# Patient Record
Sex: FEMALE | Race: BLACK OR AFRICAN AMERICAN | NOT HISPANIC OR LATINO | Employment: FULL TIME | URBAN - METROPOLITAN AREA
[De-identification: names, ages, dates, MRNs, and addresses within clinical notes are randomized per-mention and may not be internally consistent; named-entity substitution may affect disease eponyms.]

---

## 2020-01-20 ENCOUNTER — HISTORICAL (OUTPATIENT)
Dept: ADMINISTRATIVE | Facility: HOSPITAL | Age: 40
End: 2020-01-20

## 2020-01-23 LAB — FINAL CULTURE: NORMAL

## 2022-04-11 ENCOUNTER — HISTORICAL (OUTPATIENT)
Dept: ADMINISTRATIVE | Facility: HOSPITAL | Age: 42
End: 2022-04-11
Payer: MEDICAID

## 2022-04-29 VITALS
DIASTOLIC BLOOD PRESSURE: 80 MMHG | OXYGEN SATURATION: 100 % | WEIGHT: 293 LBS | BODY MASS INDEX: 47.09 KG/M2 | SYSTOLIC BLOOD PRESSURE: 129 MMHG | HEIGHT: 66 IN

## 2022-05-27 RX ORDER — BUPROPION HYDROCHLORIDE 150 MG/1
150 TABLET, EXTENDED RELEASE ORAL 2 TIMES DAILY
COMMUNITY

## 2022-05-27 RX ORDER — METFORMIN HYDROCHLORIDE 1000 MG/1
1000 TABLET ORAL 2 TIMES DAILY WITH MEALS
COMMUNITY

## 2022-05-27 RX ORDER — PIOGLITAZONEHYDROCHLORIDE 30 MG/1
30 TABLET ORAL DAILY
COMMUNITY

## 2022-05-27 RX ORDER — TRAZODONE HYDROCHLORIDE 50 MG/1
100 TABLET ORAL NIGHTLY PRN
COMMUNITY

## 2022-05-27 RX ORDER — ONDANSETRON HYDROCHLORIDE 8 MG/1
8 TABLET, FILM COATED ORAL EVERY 12 HOURS PRN
COMMUNITY

## 2022-05-27 RX ORDER — BUSPIRONE HYDROCHLORIDE 5 MG/1
5 TABLET ORAL 2 TIMES DAILY
COMMUNITY

## 2022-05-27 RX ORDER — FLUCONAZOLE 150 MG/1
150 TABLET ORAL DAILY
COMMUNITY

## 2022-06-07 DIAGNOSIS — Z12.4 ROUTINE CERVICAL SMEAR: Primary | ICD-10-CM

## 2022-10-25 DIAGNOSIS — R60.0 LOCALIZED EDEMA: ICD-10-CM

## 2022-10-25 DIAGNOSIS — I10 ESSENTIAL HYPERTENSION, MALIGNANT: Primary | ICD-10-CM

## 2022-10-25 DIAGNOSIS — R06.09 DYSPNEA ON EXERTION: Primary | ICD-10-CM

## 2022-11-08 ENCOUNTER — HOSPITAL ENCOUNTER (OUTPATIENT)
Dept: CARDIOLOGY | Facility: HOSPITAL | Age: 42
Discharge: HOME | End: 2022-11-08
Attending: INTERNAL MEDICINE
Payer: COMMERCIAL

## 2022-11-08 ENCOUNTER — HOSPITAL ENCOUNTER (OUTPATIENT)
Dept: RADIOLOGY | Facility: HOSPITAL | Age: 42
Discharge: HOME | End: 2022-11-08
Attending: INTERNAL MEDICINE
Payer: COMMERCIAL

## 2022-11-08 VITALS — HEIGHT: 66 IN | WEIGHT: 293 LBS | BODY MASS INDEX: 47.09 KG/M2

## 2022-11-08 DIAGNOSIS — R60.0 LOCALIZED EDEMA: ICD-10-CM

## 2022-11-08 DIAGNOSIS — I10 ESSENTIAL HYPERTENSION, MALIGNANT: ICD-10-CM

## 2022-11-08 DIAGNOSIS — R06.09 DYSPNEA ON EXERTION: ICD-10-CM

## 2022-11-08 LAB
AV INDEX (PROSTH): 0.53
AV MEAN GRADIENT: 6 MMHG
AV PEAK GRADIENT: 12 MMHG
AV VALVE AREA: 1.49 CM2
AV VELOCITY RATIO: 0.52
BSA FOR ECHO PROCEDURE: 3.39 M2
DOP CALC AO PEAK VEL: 1.74 M/S
DOP CALC AO VTI: 30 CM
DOP CALC LVOT AREA: 2.8 CM2
DOP CALC LVOT DIAMETER: 1.9 CM
DOP CALC LVOT PEAK VEL: 0.91 M/S
DOP CALC LVOT STROKE VOLUME: 44.77 CM3
DOP CALC MV VTI: 30.7 CM
DOP CALCLVOT PEAK VEL VTI: 15.8 CM
E WAVE DECELERATION TIME: 132 MSEC
E/A RATIO: 2.14
E/E' RATIO: 11.67 M/S
EJECTION FRACTION: 60 %
FRACTIONAL SHORTENING: 30 % (ref 28–44)
LEFT ATRIUM SIZE: 4.4 CM
LEFT ATRIUM VOLUME INDEX MOD: 21.9 ML/M2
LEFT ATRIUM VOLUME MOD: 67 CM3
LEFT INTERNAL DIMENSION IN SYSTOLE: 2.83 CM (ref 2.1–4)
LEFT VENTRICLE DIASTOLIC VOLUME INDEX: 23.56 ML/M2
LEFT VENTRICLE DIASTOLIC VOLUME: 72.1 ML
LEFT VENTRICLE SYSTOLIC VOLUME INDEX: 9.9 ML/M2
LEFT VENTRICLE SYSTOLIC VOLUME: 30.3 ML
LEFT VENTRICULAR INTERNAL DIMENSION IN DIASTOLE: 4.05 CM (ref 3.5–6)
LV LATERAL E/E' RATIO: 17.5 M/S
LV SEPTAL E/E' RATIO: 8.75 M/S
LVOT MG: 2 MMHG
LVOT MV: 0.57 CM/S
MV MEAN GRADIENT: 3 MMHG
MV PEAK A VEL: 0.49 M/S
MV PEAK E VEL: 1.05 M/S
MV PEAK GRADIENT: 8 MMHG
MV STENOSIS PRESSURE HALF TIME: 72 MS
MV VALVE AREA BY CONTINUITY EQUATION: 1.46 CM2
MV VALVE AREA P 1/2 METHOD: 3.06 CM2
PV PEAK VELOCITY: 0.96 CM/S
RA PRESSURE: 3 MMHG
RIGHT VENTRICULAR END-DIASTOLIC DIMENSION: 2.98 CM
TDI LATERAL: 0.06 M/S
TDI SEPTAL: 0.12 M/S
TDI: 0.09 M/S
TRICUSPID ANNULAR PLANE SYSTOLIC EXCURSION: 3.21 CM

## 2022-11-08 PROCEDURE — 93306 TTE W/DOPPLER COMPLETE: CPT

## 2022-11-08 PROCEDURE — 93306 TTE W/DOPPLER COMPLETE: CPT | Mod: 26,,, | Performed by: INTERNAL MEDICINE

## 2022-11-08 PROCEDURE — 93970 CV US LOWER VENOUS INSUFFICIENCY BILATERAL (CUPID ONLY): ICD-10-PCS | Mod: 26,,, | Performed by: SPECIALIST

## 2022-11-08 PROCEDURE — 93306 ECHO (CUPID ONLY): ICD-10-PCS | Mod: 26,,, | Performed by: INTERNAL MEDICINE

## 2022-11-08 PROCEDURE — 93970 EXTREMITY STUDY: CPT | Mod: TC

## 2022-11-08 PROCEDURE — 93970 EXTREMITY STUDY: CPT | Mod: 26,,, | Performed by: SPECIALIST

## 2022-12-06 ENCOUNTER — HOSPITAL ENCOUNTER (EMERGENCY)
Facility: HOSPITAL | Age: 42
Discharge: HOME | End: 2022-12-06
Attending: EMERGENCY MEDICINE
Payer: COMMERCIAL

## 2022-12-06 VITALS
HEIGHT: 66 IN | SYSTOLIC BLOOD PRESSURE: 137 MMHG | RESPIRATION RATE: 20 BRPM | OXYGEN SATURATION: 98 % | HEART RATE: 84 BPM | BODY MASS INDEX: 47.09 KG/M2 | TEMPERATURE: 99 F | WEIGHT: 293 LBS | DIASTOLIC BLOOD PRESSURE: 74 MMHG

## 2022-12-06 DIAGNOSIS — K59.00 CONSTIPATION, UNSPECIFIED CONSTIPATION TYPE: Primary | ICD-10-CM

## 2022-12-06 PROCEDURE — 25000003 PHARM REV CODE 250: Performed by: EMERGENCY MEDICINE

## 2022-12-06 PROCEDURE — 99283 EMERGENCY DEPT VISIT LOW MDM: CPT

## 2022-12-06 RX ORDER — SYRING-NEEDL,DISP,INSUL,0.3 ML 29 G X1/2"
296 SYRINGE, EMPTY DISPOSABLE MISCELLANEOUS
Status: DISCONTINUED | OUTPATIENT
Start: 2022-12-06 | End: 2022-12-06

## 2022-12-06 RX ORDER — ADHESIVE BANDAGE
10 BANDAGE TOPICAL
Status: COMPLETED | OUTPATIENT
Start: 2022-12-06 | End: 2022-12-06

## 2022-12-06 RX ORDER — POLYETHYLENE GLYCOL 3350 17 G/17G
17 POWDER, FOR SOLUTION ORAL 2 TIMES DAILY
Qty: 595 G | Refills: 0 | Status: SHIPPED | OUTPATIENT
Start: 2022-12-06

## 2022-12-06 RX ORDER — BISACODYL 10 MG
10 SUPPOSITORY, RECTAL RECTAL ONCE
Status: COMPLETED | OUTPATIENT
Start: 2022-12-06 | End: 2022-12-06

## 2022-12-06 RX ADMIN — BISACODYL 10 MG: 10 SUPPOSITORY RECTAL at 07:12

## 2022-12-06 RX ADMIN — MAGNESIUM HYDROXIDE 800 MG: 400 SUSPENSION ORAL at 08:12

## 2022-12-06 NOTE — DISCHARGE INSTRUCTIONS
Take milk of magnesia and MiraLax twice daily until the constipation resolves.  Then you can take MiraLax once daily to maintain a soft regular bowel movement.  Follow-up with your doctor for further care.

## 2022-12-06 NOTE — Clinical Note
"Taqueria Quirozbarrington Live was seen and treated in our emergency department on 12/6/2022.  She may return to work on 12/09/2022.       If you have any questions or concerns, please don't hesitate to call.      Edith DUMONT    "

## 2022-12-07 NOTE — ED PROVIDER NOTES
Encounter Date: 12/6/2022       History     Chief Complaint   Patient presents with    Rectal Pain     42-year-old female with a history of diabetes, hypertension, lumbar radiculopathy, anxiety, cervical radiculopathy, morbid obesity, complains of constipation for the last week.  She is passed a small amount of hard stool.  She is taken stool softeners and has tried to do a Fleet's enema without relief.  She states she feels like there is hard stool in her rectum.      Review of patient's allergies indicates:  No Known Allergies  Past Medical History:   Diagnosis Date    Anxiety disorder, unspecified     Cervical radicular pain     Diabetes mellitus type 2 with complications     HTN (hypertension)     Lumbar radicular pain     Obesity      No past surgical history on file.  No family history on file.  Social History     Tobacco Use    Smoking status: Never    Smokeless tobacco: Never   Substance Use Topics    Alcohol use: Not Currently    Drug use: Never     Review of Systems   Gastrointestinal:  Positive for constipation.   All other systems reviewed and are negative.    Physical Exam     Initial Vitals [12/06/22 0727]   BP Pulse Resp Temp SpO2   137/74 84 20 98.8 °F (37.1 °C) 98 %      MAP       --         Physical Exam    Nursing note and vitals reviewed.  Constitutional: She appears well-developed and well-nourished. She is not diaphoretic. No distress.   HENT:   Head: Normocephalic and atraumatic.   Mouth/Throat: Oropharynx is clear and moist.   Eyes: Conjunctivae are normal. Pupils are equal, round, and reactive to light.   Neck: Neck supple.   Cardiovascular:  Normal rate, regular rhythm, normal heart sounds and intact distal pulses.           Pulmonary/Chest: Breath sounds normal. No respiratory distress. She has no wheezes. She has no rhonchi. She has no rales.   Abdominal: Abdomen is soft. Bowel sounds are normal. She exhibits no distension. There is no abdominal tenderness.   On rectal exam there is no  palpable stool, no blood, no hemorrhoids, no abnormality. There is no guarding.   Musculoskeletal:         General: No tenderness or edema. Normal range of motion.      Cervical back: Neck supple.     Neurological: She is alert and oriented to person, place, and time.   Skin: Skin is warm and dry. Capillary refill takes less than 2 seconds. No rash noted.   Psychiatric: She has a normal mood and affect. Thought content normal.       ED Course   Procedures  Labs Reviewed - No data to display       Imaging Results    None          Medications   bisacodyL suppository 10 mg (10 mg Rectal Given 12/6/22 0750)   magnesium hydroxide 400 mg/5 ml suspension 800 mg (800 mg Oral Given 12/6/22 0824)     Medical Decision Making:   Initial Assessment:   42-year-old female with multiple comorbidities complains of constipation for the last week, passing small amounts of very hard stool.  Differential Diagnosis:   Differential diagnosis includes but is not limited to constipation, fecal impaction  ED Management:  Digital rectal exam was performed and there was no stool in the rectal vault.  I recommended that she drink the magnesium hydroxide and I placed a Dulcolax suppository at the time of the rectal exam.  At home, I recommend she take MiraLax and mild of magnesia orally twice daily until the constipation is relieved.  She has no abdominal pain so I do not feel she has a bowel obstruction or other serious pathology.                        Clinical Impression:   Final diagnoses:  [K59.00] Constipation, unspecified constipation type (Primary)        ED Disposition Condition    Discharge Stable          ED Prescriptions       Medication Sig Dispense Start Date End Date Auth. Provider    polyethylene glycol (GLYCOLAX) 17 gram/dose powder Take 17 g by mouth 2 (two) times daily. 595 g 12/6/2022 -- Leeanne Murcia MD          Follow-up Information       Follow up With Specialties Details Why Contact Info    PCP  Schedule an  appointment as soon as possible for a visit                Leeanne Murcia MD  12/07/22 2018       Leeanne Murcia MD  12/07/22 1244

## 2023-07-11 ENCOUNTER — HOSPITAL ENCOUNTER (EMERGENCY)
Facility: HOSPITAL | Age: 43
Discharge: HOME | End: 2023-07-11
Attending: STUDENT IN AN ORGANIZED HEALTH CARE EDUCATION/TRAINING PROGRAM
Payer: COMMERCIAL

## 2023-07-11 VITALS
BODY MASS INDEX: 47.09 KG/M2 | TEMPERATURE: 99 F | RESPIRATION RATE: 18 BRPM | WEIGHT: 293 LBS | HEART RATE: 98 BPM | SYSTOLIC BLOOD PRESSURE: 166 MMHG | HEIGHT: 66 IN | OXYGEN SATURATION: 98 % | DIASTOLIC BLOOD PRESSURE: 75 MMHG

## 2023-07-11 DIAGNOSIS — M79.605 PAIN OF LEFT LOWER EXTREMITY: ICD-10-CM

## 2023-07-11 DIAGNOSIS — M54.9 ACUTE LEFT-SIDED BACK PAIN, UNSPECIFIED BACK LOCATION: Primary | ICD-10-CM
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
## 2023-07-11 PROCEDURE — 25000003 PHARM REV CODE 250

## 2023-07-11 PROCEDURE — 99284 EMERGENCY DEPT VISIT MOD MDM: CPT

## 2023-07-11 RX ORDER — HYDROCODONE BITARTRATE AND ACETAMINOPHEN 7.5; 325 MG/1; MG/1
1 TABLET ORAL EVERY 6 HOURS PRN
Qty: 12 TABLET | Refills: 0 | Status: SHIPPED | OUTPATIENT
Start: 2023-07-11

## 2023-07-11 RX ORDER — ONDANSETRON 4 MG/1
4 TABLET, FILM COATED ORAL EVERY 6 HOURS PRN
Qty: 12 TABLET | Refills: 0 | Status: SHIPPED | OUTPATIENT
Start: 2023-07-11

## 2023-07-11 RX ORDER — ONDANSETRON 4 MG/1
4 TABLET, ORALLY DISINTEGRATING ORAL
Status: COMPLETED | OUTPATIENT
Start: 2023-07-11 | End: 2023-07-11

## 2023-07-11 RX ORDER — HYDROCODONE BITARTRATE AND ACETAMINOPHEN 7.5; 325 MG/1; MG/1
1 TABLET ORAL
Status: COMPLETED | OUTPATIENT
Start: 2023-07-11 | End: 2023-07-11

## 2023-07-11 RX ORDER — HYDROCODONE BITARTRATE AND ACETAMINOPHEN 7.5; 325 MG/1; MG/1
1 TABLET ORAL EVERY 6 HOURS PRN
Qty: 12 TABLET | Refills: 0 | Status: SHIPPED | OUTPATIENT
Start: 2023-07-11 | End: 2023-07-11 | Stop reason: SDUPTHER

## 2023-07-11 RX ADMIN — ONDANSETRON 4 MG: 4 TABLET, ORALLY DISINTEGRATING ORAL at 06:07

## 2023-07-11 RX ADMIN — HYDROCODONE BITARTRATE AND ACETAMINOPHEN 1 TABLET: 7.5; 325 TABLET ORAL at 06:07

## 2023-07-11 NOTE — FIRST PROVIDER EVALUATION
"Medical screening examination initiated.  I have conducted a focused provider triage encounter, findings are as follows:    Chief Complaint   Patient presents with    Back Pain     C/o L-sided neck pain, L-sided back pain, and bilateral thigh pain onset yesterday after fall in bathroom. Pt denies LOC or hitting head. Pt able to ambulate with steady gait and cane.        Brief history of present illness:  42 y.o. female presents to the E.D. with c/o left sided neck pain, low back pain and bilateral thigh pain after fall yesterday in bathroom. Did not hit head. Patient denies feeling dizzy. She is able to ambulate.    Vitals:    07/11/23 1658   BP: (!) 166/75   BP Location: Left arm   Patient Position: Sitting   Pulse: 98   Resp: 17   Temp: 98.8 °F (37.1 °C)   TempSrc: Oral   SpO2: 98%   Weight: (!) 283 kg (624 lb)   Height: 5' 6" (1.676 m)       Pertinent physical exam:  Awake, Alert, Oriented, Non labored breathing       Brief workup plan:  imaging     Preliminary workup initiated; this workup will be continued and followed by the physician or advanced practice provider that is assigned to the patient when roomed.  "

## 2023-07-11 NOTE — ED PROVIDER NOTES
Encounter Date: 7/11/2023       History     Chief Complaint   Patient presents with    Back Pain     C/o L-sided neck pain, L-sided back pain, and bilateral thigh pain onset yesterday after fall in bathroom. Pt denies LOC or hitting head. Pt able to ambulate with steady gait and cane.      42 y.o.  female with a history of obesity, anxiety, and T2DM presents to Emergency Department with a chief complaint of back pain. Symptoms began on yesterday after fall and have been constant since onset. Associated symptoms include myalgias. Symptoms are aggravated with palpation and exertion and there are no alleviating factors. The patient denies CP, SOB, fever, chills, head injury, LOC, or dizziness. She reports taking Mobic and Flexeril prior to arrival with no relief of symptoms. No other reported symptoms at this time      The history is provided by the patient. No  was used.   Back Pain   This is a new problem. The current episode started yesterday. The problem occurs throughout the day. The problem has been unchanged. The pain is associated with falling. The pain is present in the lumbar spine and thoracic spine. The pain radiates to the left leg. The pain is The same all the time. Stiffness is present All day. Pertinent negatives include no chest pain, no fever, no abdominal pain, no abdominal swelling, no bowel incontinence, no perianal numbness, no bladder incontinence, no paresthesias, no paresis, no tingling and no weakness. She has tried analgesics and muscle relaxants for the symptoms. The treatment provided no relief. Risk factors include obesity, lack of exercise and a sedentary lifestyle.   Review of patient's allergies indicates:  No Known Allergies  Past Medical History:   Diagnosis Date    Anxiety disorder, unspecified     Cervical radicular pain     Diabetes mellitus type 2 with complications     HTN (hypertension)     Lumbar radicular pain     Obesity      History  reviewed. No pertinent surgical history.  No family history on file.  Social History     Tobacco Use    Smoking status: Never    Smokeless tobacco: Never   Substance Use Topics    Alcohol use: Not Currently    Drug use: Never     Review of Systems   Constitutional:  Negative for chills, fatigue and fever.   Eyes:  Negative for photophobia and visual disturbance.   Respiratory:  Negative for cough, shortness of breath, wheezing and stridor.    Cardiovascular:  Negative for chest pain, palpitations and leg swelling.   Gastrointestinal:  Negative for abdominal pain, bowel incontinence, nausea and vomiting.   Genitourinary:  Negative for bladder incontinence.   Musculoskeletal:  Positive for arthralgias, back pain and myalgias.   Skin:  Negative for color change and wound.   Neurological:  Negative for tingling, weakness and paresthesias.   All other systems reviewed and are negative.    Physical Exam     Initial Vitals [07/11/23 1658]   BP Pulse Resp Temp SpO2   (!) 166/75 98 17 98.8 °F (37.1 °C) 98 %      MAP       --         Physical Exam    Nursing note and vitals reviewed.  Constitutional: She appears well-developed and well-nourished. She is not diaphoretic. She is Obese .  Non-toxic appearance. No distress.   HENT:   Head: Normocephalic and atraumatic.   Right Ear: External ear normal.   Left Ear: External ear normal.   Nose: Nose normal.   Mouth/Throat: Oropharynx is clear and moist.   Eyes: Conjunctivae and EOM are normal. Pupils are equal, round, and reactive to light.   Neck: Neck supple.   Normal range of motion.  Cardiovascular:  Normal rate, regular rhythm, S1 normal, S2 normal, normal heart sounds, intact distal pulses and normal pulses.           Pulmonary/Chest: Effort normal and breath sounds normal. No respiratory distress. She has no wheezes. She exhibits no tenderness.   Abdominal: Abdomen is soft. Bowel sounds are normal. She exhibits no distension. There is no abdominal tenderness. There is no  guarding.   Musculoskeletal:         General: Tenderness present. Normal range of motion.      Cervical back: Normal range of motion and neck supple.        Back:       Comments: Tenderness noted to outlined area. No spinous tenderness noted. Also, tenderness noted to L upper leg and bilateral hips. Full 5/5 ROM noted. CMS intact. All other adjacent joints otherwise normal.        Neurological: She is alert and oriented to person, place, and time. She has normal strength. No sensory deficit. GCS score is 15. GCS eye subscore is 4. GCS verbal subscore is 5. GCS motor subscore is 6.   Skin: Skin is warm and dry. Capillary refill takes less than 2 seconds. No rash noted. No erythema.   Psychiatric: She has a normal mood and affect. Thought content normal.       ED Course   Procedures  Labs Reviewed - No data to display       Imaging Results              X-Ray Hips Bilateral 2 View Incl AP Pelvis (Final result)  Result time 07/11/23 19:04:04      Final result by Helena Petersen MD (07/11/23 19:04:04)                   Impression:      No obvious fracture is seen however the examination is limited due to patient's body habitus.  If clinical suspicion remains high CT scan correlation is recommended.      Electronically signed by: Helena Petersen  Date:    07/11/2023  Time:    19:04               Narrative:    EXAMINATION:  XR HIPS BILATERAL 2 VIEW INCL AP PELVIS    CLINICAL HISTORY:  Unspecified fall, initial encounter    TECHNIQUE:  AP view of the pelvis and frogleg lateral views of both hips were performed.    COMPARISON:  None.    FINDINGS:  The bones and joints are in good anatomic alignment with no obvious fracture or dislocation is seen.  The examination is limited however due to the patient's body habitus.  There are some degenerative changes seen in the hips bilaterally.                                       X-Ray Femur 2 View Left (Final result)  Result time 07/11/23 19:03:03      Final result by  Helena Petersen MD (07/11/23 19:03:03)                   Impression:      No fracture seen but examination is limited due to patient's body habitus.  If clinical suspicion remains high CT scan correlation is recommended.      Electronically signed by: Helena Petersen  Date:    07/11/2023  Time:    19:03               Narrative:    EXAMINATION:  XR FEMUR 2 VIEW LEFT    CLINICAL HISTORY:  fall;    TECHNIQUE:  AP and lateral views of the left femur were performed.    COMPARISON:  None}    FINDINGS:  The bones and joints appear to be in good anatomic alignment with no obvious fracture or dislocation is seen.  The examination is limited due to the patient's body habitus.  No soft tissue abnormality is seen.                                       X-Ray Lumbar Spine Ap And Lateral (Final result)  Result time 07/11/23 19:04:33      Final result by Silke Sesay MD (07/11/23 19:04:33)                   Impression:      No appreciable acute osseous abnormality by plain radiographic evaluation.  Note exam is severely limited due to body habitus      Electronically signed by: Silke Sesay  Date:    07/11/2023  Time:    19:04               Narrative:    EXAMINATION:  XR LUMBAR SPINE AP AND LATERAL    CLINICAL HISTORY:  fall;    TECHNIQUE:  3 views of the lumbar spine were performed.    COMPARISON:  None.    FINDINGS:  LIMITATIONS: Exam limited by artifact related to patient body habitus, positioning, dose lowering technique and/or motion.    BONES: Vertebral body heights are maintained as visible on the frontal view.  Lateral view is severely limited due to patient body habitus..  Alignment is grossly normal.    DISCS: Unable to assess.    SOFT TISSUES: Regional soft tissues unremarkable.                                       X-Ray Cervical Spine AP And Lateral (Final result)  Result time 07/11/23 19:07:20      Final result by Helena Petersen MD (07/11/23 19:07:20)                   Impression:      No  obvious fracture seen however the lateral examination is somewhat limited due to the patient's body habitus.  If clinical suspicion remains high CT scan correlation is recommended.      Electronically signed by: Helena Petersen  Date:    07/11/2023  Time:    19:07               Narrative:    EXAMINATION:  XR CERVICAL SPINE AP LATERAL    CLINICAL HISTORY:  fall;    TECHNIQUE:  AP, lateral and open mouth views of the cervical spine were performed.    COMPARISON:  06/03/2019    FINDINGS:  The vertebral body heights and alignment are well maintained.  No fracture is seen.  No dislocation is seen.  The lower cervical spine is not well evaluated on this exam.  No gross abnormality seen.  Odontoid lateral masses appear grossly unremarkable.  No soft tissue abnormality is seen.                                       X-Ray Thoracic Spine AP Lateral (Final result)  Result time 07/11/23 19:08:45      Final result by Helena Petersen MD (07/11/23 19:08:45)                   Impression:      Nondiagnostic evaluation due to the patient's body habitus      Electronically signed by: Helena Petersen  Date:    07/11/2023  Time:    19:08               Narrative:    EXAMINATION:  XR THORACIC SPINE AP LATERAL    CLINICAL HISTORY:  fall;    TECHNIQUE:  AP and lateral views of the thoracic spine were performed.    COMPARISON:  None    FINDINGS:  The examination is limited.  The lateral view is inadequate due to the patient's body habitus.  No determination can be made about the knee thoracic spine injury.                                       Medications   HYDROcodone-acetaminophen 7.5-325 mg per tablet 1 tablet (1 tablet Oral Given 7/11/23 1849)   ondansetron disintegrating tablet 4 mg (4 mg Oral Given 7/11/23 1849)     Medical Decision Making:   Initial Assessment:   Patient awake, alert, has non-labored breathing, and follows commands appropriately. C/o back pain, L upper leg pain, and bilateral hip pain after fall on  yesterday. States the L side of her back hit the tub. -LOC or head injury. GCS 15. NAD noted.   Differential Diagnosis:   Back Pain, Fall, Joint Pain  Clinical Tests:   Radiological Study: Ordered and Reviewed  ED Management:  Co-morbidities and/or factors adding to the complexity or risk for the patient?: none  Differential diagnoses: Back Pain, Fall, Joint Pain  Decision to obtain previous or outside records?: I did not review records.   Chart Review (nursing home, outside records, CareEverywhere): none  Review of RX medications/new RX prescribed by me?: Prescribed Norco and Zofran. Cautioned on side effects.   Labs/imaging/other tests obtained/considered (risk/benefits of testing discussed): Xray cervical spine, lumbar spine, thoracic spine, bilateral hip pain, and L femur. Informed patient of results.   My independent imaging interpretation: none  Treatment/interventions, IV fluids, IV medications: Norco and Zofran given in ER.   Complex management (IV controlled substances, went to OR, DNR, meds requiring monitoring, transfer, etc)?: none  Workup/treatment affected by social determinants of health?:none   Point of care US done/interpretation: none  Consults/radiologist/EMS/social work/family discussion/alternate history: none  Advanced care planning/end of life discussion: none  Shared decision making: Discussed plan of care and interventions with patient. Agreed to and aware of plan of care. Comfortable being discharged home.   ETOH/smoking/drug cessation discussion: none  Dispo: Patient discharged home. Patient denies new or additional complaints; no further tests indicated at this time. Verbalized understanding of instructions. No emergent or apparent distress noted prior to discharge. To follow up with PCP in 1 week as needed. Strict ER return precautions given.                ED Course as of 07/11/23 1944 Tue Jul 11, 2023   0087 Spoke with CT tech; will assess patient at bedside for clearance for CT  scanner due to weight. [JA]   1801 Patient unable to fit in CT machine. Will order xray imaging.  [JA]   1918 Reviewed patient's imaging. Since patient has no spinous tenderness, deformities, step offs, has 5/5 ROM to all 4 extremities, and is ambulatory no further imaging indicated at this time. [JA]      ED Course User Index  [JA] Radha Turner NP                 Clinical Impression:   Final diagnoses:  [W19.XXXA] Fall  [M54.9] Acute left-sided back pain, unspecified back location (Primary)  [M79.605] Pain of left lower extremity        ED Disposition Condition    Discharge Stable          ED Prescriptions       Medication Sig Dispense Start Date End Date Auth. Provider    HYDROcodone-acetaminophen (NORCO) 7.5-325 mg per tablet  (Status: Discontinued) Take 1 tablet by mouth every 6 (six) hours as needed for Pain. 12 tablet 7/11/2023 7/11/2023 Radha Turner NP    HYDROcodone-acetaminophen (NORCO) 7.5-325 mg per tablet Take 1 tablet by mouth every 6 (six) hours as needed for Pain. 12 tablet 7/11/2023 -- Radha Turner NP    ondansetron (ZOFRAN) 4 MG tablet Take 1 tablet (4 mg total) by mouth every 6 (six) hours as needed for Nausea. 12 tablet 7/11/2023 -- Radha Turner NP          Follow-up Information       Follow up With Specialties Details Why Contact Info    PCP  Call in 1 week As needed, If symptoms worsen     [facility] - Emergency Dept Emergency Medicine Go to  If symptoms worsen, As needed [facility] [facility] [id number] [phone]             Radha Turner NP  07/11/23 1948

## 2023-07-11 NOTE — Clinical Note
"Taqueria"Zhao Live was seen and treated in our emergency department on 7/11/2023.  She may return to work on 07/13/2023.       If you have any questions or concerns, please don't hesitate to call.      Radha Turner NP"

## 2023-07-12 NOTE — DISCHARGE INSTRUCTIONS
Thanks for letting us take care of you today!  It is our goal to give you courteous care and to keep you comfortable and informed, if you have any questions before you leave I will be happy to try and answer them.    Here is some advice after your visit:      Your visit in the emergency department is NOT definitive care - please follow-up with your primary care doctor and/or specialist within 1 week.  Please return if you have any worsening in your condition or if you have any other concerns.    If you had radiology exams like an XRAY or CT in the emergency Department the interpreation on them may be preliminary - there may be less time sensitive findings on the reports please obtain these reports within 24 hours from the hospital or by using your out on your mobile phone to access records.  Bring these to your primary care doctor and/or specialist for further review of incidental findings.    If you were prescribed OPIATE PAIN MEDICATION - please understand of these medications can be addictive, you may fill less of the prescription was written for, you do not have to take the full prescription.  You may discard what you do not use.  Please seek help if you feel you are having problems with addiction.  Do not drive or operate heavy machinery if you are taking sedating medications.  Do not mix these medications with alcohol.

## 2023-12-12 DIAGNOSIS — Z12.31 BREAST CANCER SCREENING BY MAMMOGRAM: Primary | ICD-10-CM

## 2023-12-22 ENCOUNTER — HOSPITAL ENCOUNTER (OUTPATIENT)
Dept: RADIOLOGY | Facility: HOSPITAL | Age: 43
Discharge: HOME | End: 2023-12-22
Attending: PHYSICIAN ASSISTANT
Payer: COMMERCIAL

## 2023-12-22 DIAGNOSIS — Z12.31 BREAST CANCER SCREENING BY MAMMOGRAM: ICD-10-CM

## 2023-12-22 PROCEDURE — 77063 BREAST TOMOSYNTHESIS BI: CPT | Mod: 26,,, | Performed by: RADIOLOGY

## 2023-12-22 PROCEDURE — 77067 MAMMO DIGITAL SCREENING BILAT WITH TOMO: ICD-10-PCS | Mod: 26,,, | Performed by: RADIOLOGY

## 2023-12-22 PROCEDURE — 77067 SCR MAMMO BI INCL CAD: CPT | Mod: TC

## 2023-12-22 PROCEDURE — 77063 MAMMO DIGITAL SCREENING BILAT WITH TOMO: ICD-10-PCS | Mod: 26,,, | Performed by: RADIOLOGY

## 2023-12-22 PROCEDURE — 77067 SCR MAMMO BI INCL CAD: CPT | Mod: 26,,, | Performed by: RADIOLOGY
